# Patient Record
(demographics unavailable — no encounter records)

---

## 2024-12-19 NOTE — HISTORY OF PRESENT ILLNESS
[TextBox_4] : 69-year-old female with abnormal chest CT presents for follow-up.  Patient complains of occasional dry cough without fever, chills, chest pain, hemoptysis, night sweats or weight loss.  Repeat chest CT was recently performed.

## 2024-12-19 NOTE — DISCUSSION/SUMMARY
[FreeTextEntry1] : 69-year-old female with improvement in benign-appearing lung abnormalities on chest CT.  I reviewed the images online and discussed findings with both the patient and her daughter who was present throughout.  At present there is no need for follow-up chest CT unless clinically indicated.  PFTs will be performed in the future if cough persist.  She is to follow-up with her PMD as before.